# Patient Record
Sex: FEMALE | Race: OTHER | NOT HISPANIC OR LATINO | URBAN - METROPOLITAN AREA
[De-identification: names, ages, dates, MRNs, and addresses within clinical notes are randomized per-mention and may not be internally consistent; named-entity substitution may affect disease eponyms.]

---

## 2023-10-28 ENCOUNTER — EMERGENCY (EMERGENCY)
Facility: HOSPITAL | Age: 53
LOS: 0 days | Discharge: HOME | End: 2023-10-28
Attending: EMERGENCY MEDICINE
Payer: MEDICAID

## 2023-10-28 VITALS
WEIGHT: 293 LBS | OXYGEN SATURATION: 99 % | TEMPERATURE: 98 F | SYSTOLIC BLOOD PRESSURE: 145 MMHG | HEART RATE: 79 BPM | RESPIRATION RATE: 18 BRPM | HEIGHT: 69 IN | DIASTOLIC BLOOD PRESSURE: 89 MMHG

## 2023-10-28 DIAGNOSIS — M54.6 PAIN IN THORACIC SPINE: ICD-10-CM

## 2023-10-28 DIAGNOSIS — I10 ESSENTIAL (PRIMARY) HYPERTENSION: ICD-10-CM

## 2023-10-28 DIAGNOSIS — M25.511 PAIN IN RIGHT SHOULDER: ICD-10-CM

## 2023-10-28 DIAGNOSIS — M54.2 CERVICALGIA: ICD-10-CM

## 2023-10-28 DIAGNOSIS — M62.838 OTHER MUSCLE SPASM: ICD-10-CM

## 2023-10-28 DIAGNOSIS — E11.9 TYPE 2 DIABETES MELLITUS WITHOUT COMPLICATIONS: ICD-10-CM

## 2023-10-28 DIAGNOSIS — Z88.8 ALLERGY STATUS TO OTHER DRUGS, MEDICAMENTS AND BIOLOGICAL SUBSTANCES: ICD-10-CM

## 2023-10-28 DIAGNOSIS — Z87.891 PERSONAL HISTORY OF NICOTINE DEPENDENCE: ICD-10-CM

## 2023-10-28 PROCEDURE — 99283 EMERGENCY DEPT VISIT LOW MDM: CPT | Mod: 25

## 2023-10-28 PROCEDURE — 99284 EMERGENCY DEPT VISIT MOD MDM: CPT

## 2023-10-28 PROCEDURE — 96372 THER/PROPH/DIAG INJ SC/IM: CPT

## 2023-10-28 RX ORDER — METHOCARBAMOL 500 MG/1
2 TABLET, FILM COATED ORAL
Qty: 12 | Refills: 0
Start: 2023-10-28 | End: 2023-10-29

## 2023-10-28 RX ORDER — METHOCARBAMOL 500 MG/1
1500 TABLET, FILM COATED ORAL ONCE
Refills: 0 | Status: COMPLETED | OUTPATIENT
Start: 2023-10-28 | End: 2023-10-28

## 2023-10-28 RX ORDER — KETOROLAC TROMETHAMINE 30 MG/ML
30 SYRINGE (ML) INJECTION ONCE
Refills: 0 | Status: DISCONTINUED | OUTPATIENT
Start: 2023-10-28 | End: 2023-10-28

## 2023-10-28 RX ADMIN — METHOCARBAMOL 1500 MILLIGRAM(S): 500 TABLET, FILM COATED ORAL at 11:19

## 2023-10-28 RX ADMIN — Medication 30 MILLIGRAM(S): at 11:19

## 2023-10-28 NOTE — ED PROVIDER NOTE - CLINICAL SUMMARY MEDICAL DECISION MAKING FREE TEXT BOX
53-year-old female presents emergency department for neck and shoulder pain concerning for muscle spasm.  Patient given medications and discharged home with strict return precautions.  No signs of infection.

## 2023-10-28 NOTE — ED PROVIDER NOTE - OBJECTIVE STATEMENT
Pt is a 53F with a PMHx of HTN and DM2, otherwise healthy, presenting with right sided upper trapezius/sternocleidomastoid region lateral neck and upper shoulder pain x2-3 months. Pt states the pain is worse on movement/turning her head, nonradiating, with no associated f/c/malaise, decreased hearing, sore throat, runny nose, blurry/double vision, difficulty swallowing, SOB, cough, or other complaints. Pt has taken no analgesia PTA today.

## 2023-10-28 NOTE — ED PROVIDER NOTE - PATIENT PORTAL LINK FT
You can access the FollowMyHealth Patient Portal offered by [facility] by registering at the following website: [email or web address]. By joining [facility]’s FollowMyHealth portal, you will also be able to view your health information using other applications (apps) compatible with our system.

## 2023-10-28 NOTE — ED PROVIDER NOTE - ATTENDING CONTRIBUTION TO CARE
53-year-old female with past medical history of hypertension diabetes presents the emergency department for ear neck and shoulder pain for the past 2 to 3 months but worsened over the past week.  Pain is worse with palpation and movement.  No fever chills ear discharge or swelling.  Patient's not taking any medications for this.    Const: NAD  Eyes: PERRL, no conjunctival injection  HENT:  Neck supple without meningismus, L and R TM normal. No pain with movement of pinna. no mastoid tenderness b/l. Pt has tenderness or L side of neck and trapezius    CV: RRR, Warm, well-perfused extremities  RESP: CTA B/L, no tachypnea   GI: soft, non-tender, non-distended  MSK: No gross deformities appreciated  Skin: Warm, dry. No rashes

## 2023-10-28 NOTE — ED PROVIDER NOTE - NSFOLLOWUPCLINICS_GEN_ALL_ED_FT
[facility]  Medicine  [address], [place]   Phone: [phone]  Fax:   Follow Up Time: Routine

## 2023-10-28 NOTE — ED PROVIDER NOTE - NSFOLLOWUPINSTRUCTIONS_ED_ALL_ED_FT
Acute Neck Pain    WHAT YOU NEED TO KNOW:    Acute neck pain starts suddenly, increases quickly, and goes away in a few days. The pain may come and go, or be worse with certain movements. The pain may be only in your neck, or it may move to your arms, back, or shoulders. You may also have pain that starts in another body area and moves to your neck.    DISCHARGE INSTRUCTIONS:    - Return to the emergency department if:   You have an injury that causes neck pain and shooting pain down your arms or legs.  Your neck pain suddenly becomes severe.  You have neck pain along with numbness, tingling, or weakness in your arms or legs.  You have a stiff neck, a headache, and a fever.    - Contact your healthcare provider if:   You have new or worsening symptoms.  Your symptoms continue even after treatment.  You have questions or concerns about your condition or care.    - Medicines:   NSAIDs, such as ibuprofen, help decrease swelling, pain, and fever. This medicine is available without a doctor's order. Ask your healthcare provider which medicine to take and how often to take it. Follow directions. NSAIDs can cause stomach bleeding or kidney problems if not taken correctly. If you take blood thinner medicine, always ask if NSAIDs are safe for you.  Acetaminophen helps decrease pain and fever. Ask your healthcare provider how much to take and how often to take it. Follow directions. Acetaminophen can cause liver damage if not taken correctly.  Steroid medicine may be used to reduce inflammation. This can help relieve pain caused by swelling.  Take your medicine as directed. Contact your healthcare provider if you think your medicine is not helping or if you have side effects. Tell him or her if you are allergic to any medicine. Keep a list of the medicines, vitamins, and herbs you take. Include the amounts, and when and why you take them. Bring the list or the pill bottles to follow-up visits. Carry your medicine list with you in case of an emergency.    - Manage or prevent acute neck pain:   Rest your neck as directed. Do not make sudden movements, such as turning your head quickly. Your healthcare provider may recommend you wear a cervical collar for a short time. The collar will prevent you from moving your head. This will give your neck time to heal if an injury is causing your neck pain. Ask your healthcare provider when you can return to sports or other normal daily activities.  Apply heat as directed. Heat helps relieve pain and swelling. Use a heat wrap, or soak a small towel in warm water. Wring out the extra water. Apply the heat wrap or towel for 20 minutes every hour, or as directed.  Apply ice as directed. Ice helps relieve pain and swelling, and can help prevent tissue damage. Use an ice pack, or put ice in a bag. Cover the ice pack or back with a towel before you apply it to your neck. Apply the ice pack or ice for 15 minutes every hour, or as directed. Your healthcare provider can tell you how often to apply ice.  Do neck exercises as directed. Neck exercises help strengthen the muscles and increase range of motion. Your healthcare provider will tell you which exercises are right for you. He may give you instructions, or he may recommend that you work with a physical therapist. Your healthcare provider or therapist can make sure you are doing the exercises correctly.   Maintain good posture. Try to keep your head and shoulders lifted when you sit. If you work in front of a computer, make sure the monitor is at the right level. You should not need to look up down to see the screen. You should also not have to lean forward to be able to read what is on the screen. Make sure your keyboard, mouse, and other computer items are placed where you do not have to extend your shoulder to reach them. Get up often if you work in front of a computer or sit for long periods of time. Stretch or walk around to keep your neck muscles loose.    Follow up with your healthcare provider as directed: Your healthcare provider may refer you to a specialist if your pain does not get better with treatment. Write down your questions so you remember to ask them during your visits.

## 2023-10-28 NOTE — ED PROVIDER NOTE - PHYSICAL EXAMINATION
CONSTITUTIONAL:  NAD;   SKIN:  warm, dry; no rashes,  HEAD:  NCAT;   EYES:  EOMI, PERRLA, NL inspection;   ENT:  TMs clear bilaterally, external auditory canals clear bilaterally with no erythema/debris, mastoids without erythema/induration/TTP; posterior oropharynx clear, uvula midline; MMM;   NECK: + right upper trapezius and sternocleidomastoid muscle spasm/localized TTP; otherwise anterior neck supple; normal ROM; no midline c/t/l spine TTP, stepoffs, or deformities;  CARD:  RRR;   RESP:  CTAB;   ABD:  S/NT, no R/G;   MSK:  no extremity injury/deformity;   NEURO:  grossly unremarkable;   PSYCH:  cooperative, appropriate;

## 2023-10-28 NOTE — ED PROVIDER NOTE - PROGRESS NOTE DETAILS
TD: Pt reassessed, states she feels better, pain is improved. Patient to be discharged from ED. Any available test results were discussed with patient and/or family. Plan discussed including options for supportive care measures, any prescriptions sent to the pharmacy or over-the-counter medication/treatment options, follow up with PMD and/or specialists as indicated, and return precautions with strict instructions to return to the nearest ED immediately for any new, worsening, or concerning symptoms. Patient and/or their family endorse understanding and agreement with plan. Written discharge instructions additionally given; patient ready for discharge.